# Patient Record
Sex: FEMALE | Race: WHITE | URBAN - METROPOLITAN AREA
[De-identification: names, ages, dates, MRNs, and addresses within clinical notes are randomized per-mention and may not be internally consistent; named-entity substitution may affect disease eponyms.]

---

## 2022-08-02 ENCOUNTER — OFFICE VISIT (OUTPATIENT)
Dept: SLEEP MEDICINE | Age: 67
End: 2022-08-02
Payer: COMMERCIAL

## 2022-08-02 VITALS
TEMPERATURE: 98.2 F | BODY MASS INDEX: 27.93 KG/M2 | OXYGEN SATURATION: 98 % | DIASTOLIC BLOOD PRESSURE: 77 MMHG | HEART RATE: 67 BPM | SYSTOLIC BLOOD PRESSURE: 132 MMHG | HEIGHT: 68 IN | WEIGHT: 184.3 LBS

## 2022-08-02 DIAGNOSIS — G47.33 OSA (OBSTRUCTIVE SLEEP APNEA): Primary | ICD-10-CM

## 2022-08-02 PROCEDURE — 99203 OFFICE O/P NEW LOW 30 MIN: CPT | Performed by: INTERNAL MEDICINE

## 2022-08-02 NOTE — PATIENT INSTRUCTIONS
217 [facility] [place]. [place], [address]  Tel.  [phone]  Fax. 100 [facility] 60  [place], [address] [facility]  Tel.  [phone]  Fax. [phone] [address]  Tel.  [phone]  Fax. [phone]     Sleep Apnea: After Your Visit  Your Care Instructions  Sleep apnea occurs when you frequently stop breathing for 10 seconds or longer during sleep. It can be mild to severe, based on the number of times per hour that you stop breathing or have slowed breathing. Blocked or narrowed airways in your nose, mouth, or throat can cause sleep apnea. Your airway can become blocked when your throat muscles and tongue relax during sleep. Sleep apnea is common, occurring in 1 out of 20 individuals. Individuals having any of the following characteristics should be evaluated and treated right away due to high risk and detrimental consequences from untreated sleep apnea:  Obesity  Congestive Heart failure  Atrial Fibrillation  Uncontrolled Hypertension  Type II Diabetes  Night-time Arrhythmias  Stroke  Pulmonary Hypertension  High-risk Driving Populations (pilots, truck drivers, etc.)  Patients Considering Weight-loss Surgery    How do you know you have sleep apnea? You probably have sleep apnea if you answer 'yes' to 3 or more of the following questions:  S - Have you been told that you Snore? T - Are you often Tired during the day? O - Has anyone Observed you stop breathing while sleeping? P- Do you have (or are being treated for) high blood Pressure? B - Are you obese (Body Mass Index > 35)? A - Is your Age 48years old or older? N - Is your Neck size greater than 16 inches? G - Are you male Gender? A sleep physician can prescribe a breathing device that prevents tissues in the throat from blocking your airway. Or your doctor may recommend using a dental device (oral breathing device) to help keep your airway open.  In some cases, surgery may be needed to remove enlarged tissues in the throat. Follow-up care is a key part of your treatment and safety. Be sure to make and go to all appointments, and call your doctor if you are having problems. It's also a good idea to know your test results and keep a list of the medicines you take. How can you care for yourself at home? Lose weight, if needed. It may reduce the number of times you stop breathing or have slowed breathing. Go to bed at the same time every night. Sleep on your side. It may stop mild apnea. If you tend to roll onto your back, sew a pocket in the back of your paCommutePays top. Put a tennis ball into the pocket, and stitch the pocket shut. This will help keep you from sleeping on your back. Avoid alcohol and medicines such as sleeping pills and sedatives before bed. Do not smoke. Smoking can make sleep apnea worse. If you need help quitting, talk to your doctor about stop-smoking programs and medicines. These can increase your chances of quitting for good. Prop up the head of your bed 4 to 6 inches by putting bricks under the legs of the bed. Treat breathing problems, such as a stuffy nose, caused by a cold or allergies. Use a continuous positive airway pressure (CPAP) breathing machine if lifestyle changes do not help your apnea and your doctor recommends it. The machine keeps your airway from closing when you sleep. If CPAP does not help you, ask your doctor whether you should try other breathing machines. A bilevel positive airway pressure machine has two types of air pressureâone for breathing in and one for breathing out. Another device raises or lowers air pressure as needed while you breathe. If your nose feels dry or bleeds when using one of these machines, talk with your doctor about increasing moisture in the air. A humidifier may help.   If your nose is runny or stuffy from using a breathing machine, talk with your doctor about using decongestants or a corticosteroid nasal spray.  When should you call for help? Watch closely for changes in your health, and be sure to contact your doctor if:  You still have sleep apnea even though you have made lifestyle changes. You are thinking of trying a device such as CPAP. You are having problems using a CPAP or similar machine. Where can you learn more? Go to Five Below. Enter H907 in the search box to learn more about \"Sleep Apnea: After Your Visit. \"   © 8627-6937 Healthwise, Incorporated. Care instructions adapted under license by [employer] (which disclaims liability or warranty for this information). This care instruction is for use with your licensed healthcare professional. If you have questions about a medical condition or this instruction, always ask your healthcare professional. Holli Mezave any warranty or liability for your use of this information. PROPER SLEEP HYGIENE    What to avoid  Do not have drinks with caffeine, such as coffee or black tea, for 8 hours before bed. Do not smoke or use other types of tobacco near bedtime. Nicotine is a stimulant and can keep you awake. Avoid drinking alcohol late in the evening, because it can cause you to wake in the middle of the night. Do not eat a big meal close to bedtime. If you are hungry, eat a light snack. Do not drink a lot of water close to bedtime, because the need to urinate may wake you up during the night. Do not read or watch TV in bed. Use the bed only for sleeping and sexual activity. What to try  Go to bed at the same time every night, and wake up at the same time every morning. Do not take naps during the day. Keep your bedroom quiet, dark, and cool. Get regular exercise, but not within 3 to 4 hours of your bedtime. .  Sleep on a comfortable pillow and mattress. If watching the clock makes you anxious, turn it facing away from you so you cannot see the time.   If you worry when you lie down, start a worry book. Well before bedtime, write down your worries, and then set the book and your concerns aside. Try meditation or other relaxation techniques before you go to bed. If you cannot fall asleep, get up and go to another room until you feel sleepy. Do something relaxing. Repeat your bedtime routine before you go to bed again. Make your house quiet and calm about an hour before bedtime. Turn down the lights, turn off the TV, log off the computer, and turn down the volume on music. This can help you relax after a busy day. Drowsy Driving  The [address] [place] [address] Road Traffic Safety Administration cites drowsiness as a causing factor in more than 377,549 police reported crashes annually, resulting in 76,000 injuries and 1,500 deaths. Other surveys suggest 55% of people polled have driven while drowsy in the past year, 23% had fallen asleep but not crashed, 3% crashed, and 2% had and accident due to drowsy driving. Who is at risk? Young Drivers: One study of drowsy driving accidents states that 55% of the drivers were under 25 years. Of those, 75% were male. Shift Workers and Travelers: People who work overnight or travel across time zones frequently are at higher risk of experiencing Circadian Rhythm Disorders. They are trying to work and function when their body is programed to sleep. Sleep Deprived: Lack of sleep has a serious impact on your ability to pay attention or focus on a task. Consistently getting less than the average of 8 hours your body needs creates partial or cumulative sleep deprivation. Untreated Sleep Disorders: Sleep Apnea, Narcolepsy, R.L.S., and other sleep disorders (untreated) prevent a person from getting enough restful sleep. This leads to excessive daytime sleepiness and increases the risk for drowsy driving accidents by up to 7 times. Medications / Alcohol: Even over the counter medications can cause drowsiness.  Medications that impair a drivers attention should have a warning label. Alcohol naturally makes you sleepy and on its own can cause accidents. Combined with excessive drowsiness its effects are amplified. Signs of Drowsy Driving:   * You don't remember driving the last few miles   * You may drift out of your tigre   * You are unable to focus and your thoughts wander   * You may yawn more often than normal   * You have difficulty keeping your eyes open / nodding off   * Missing traffic signs, speeding, or tailgating  Prevention-   Good sleep hygiene, lifestyle and behavioral choices have the most impact on drowsy driving. There is no substitute for sleep and the average person requires 8 hours nightly. If you find yourself driving drowsy, stop and sleep. Consider the sleep hygiene tips provided during your visit as well. Medication Refill Policy: Refills for all medications require 1 week advance notice. Please have your pharmacy fax a refill request. We are unable to fax, or call in \"controled substance\" medications and you will need to pick these prescriptions up from our office. [facility] Activation    Thank you for requesting access to [facility]. Please follow the instructions below to securely access and download your online medical record. [facility] allows you to send messages to your doctor, view your test results, renew your prescriptions, schedule appointments, and more. How Do I Sign Up? In your internet browser, go to [email or web address]. Click on the First Time User? Click Here link in the Sign In box. You will see the New Member Sign Up page. Enter your [employer] Access Code exactly as it appears below. You will not need to use this code after youve completed the sign-up process. If you do not sign up before the expiration date, you must request a new code.     [facility] Access Code: [email or web address]  Expires: 2022  9:44 AM (This is the date your [employer] access code will )    Enter the last four digits of your Social Security Number (xxxx) and Date of Birth (mm/dd/yyyy) as indicated and click Submit. You will be taken to the next sign-up page. Create a Infotrieve ID. This will be your Infotrieve login ID and cannot be changed, so think of one that is secure and easy to remember. Create a Infotrieve password. You can change your password at any time. Enter your Password Reset Question and Answer. This can be used at a later time if you forget your password. Enter your e-mail address. You will receive e-mail notification when new information is available in [address] Click Sign Up. You can now view and download portions of your medical record. Click the Zoodak link to download a portable copy of your medical information. Additional Information    If you have questions, please call [phone]. Remember, Infotrieve is NOT to be used for urgent needs. For medical emergencies, dial 911.

## 2022-08-02 NOTE — PROGRESS NOTES
217 [facility] [place]. [place], [address]  Tel.  [phone]  Fax. 100 [facility] 60  [place], [address] [facility]  Tel.  [phone]  Fax. [phone] [address]  Tel.  [phone]  Fax. [phone]       Donnell Menjivar is a 73y.o. year old female referred by Dr. Roz River  for evaluation of a sleep disorder. ASSESSMENT/PLAN:      ICD-10-CM ICD-9-CM    1. SPENCER (obstructive sleep apnea)  G47.33 327.23 SLEEP STUDY UNATTENDED, 4 CHANNEL          Patient has a history and examination consistent with the diagnosis of sleep apnea. Follow-up and Dispositions    Return for telephone follow-up after testing is completed. * The patient currently has a Low Risk for having sleep apnea. STOP-BANG score 3.    * Sleep testing was ordered for initial evaluation. Orders Placed This Encounter    SLEEP STUDY UNATTENDED, 4 CHANNEL     Order Specific Question:   Reason for Exam     Answer:   SPENCER       * She was provided information on sleep apnea including corresponding risk factors and the importance of proper treatment. * Treatment options were reviewed in detail, she would like to proceed with OAT (Dr. Roz River) / PAP therapy. * The patient was counseled regarding proper sleep hygiene, with emphasis on ensuring sufficient total sleep time; safe driving and the benefits of exercise and weight loss. * All of her questions were addressed. 2. Recommended a dedicated weight loss program through appropriate diet and exercise regimen as significant weight reduction has been shown to reduce severity of obstructive sleep apnea. SUBJECTIVE/OBJECTIVE:    Donnell Menjivar is an 73 y.o. female referred for evaluation for a sleep disorder. She complains of snoring associated with waking up snorting and day time tiredness, falling asleep easily when inactive.   Symptoms began several years ago, may be slightly improved since that time. She usually can fall asleep in 5 minutes. Family or house members note snoring. She denies of symptoms indicative of cataplexy, sleep paralysis or sleep related hallucinations. She denies of a history of unusual movements occurring during sleep. Blank Simon does wake up frequently at night. She is bothered by waking up too early and left unable to get back to sleep. She actually sleeps about   hours at night and wakes up about 3 times during the night. She does not work shifts:  .   Gracia Canal indicates she does get too little sleep at night. Her bedtime is 2100. She awakens at 0600. She does not take naps. She has the following observed behaviors: Loud snoring, Light snoring;  . Other remarks: waking with a gasp or snort    The patient has not undergone diagnostic testing for the current problems. Review of Systems:  Constitutional:  No significant weight loss or weight gain  Eyes:  No blurred vision  CVS:  No significant chest pain  Pulm:  No significant shortness of breath  GI:  No significant nausea or vomiting  :  No significant nocturia  Musculoskeletal:  No significant joint pain at night  Skin:  No significant rashes  Neuro:  No significant dizziness   Psych:  No active mood issues    Sleep Review of Systems: notable for Negative difficulty falling asleep; Negative awakenings at night; Negative perceived regular dreaming; Negative nightmares; Negative  early morning headaches; Negative  memory problems; Negative  concentration issues; Negative caffeine;  Negative alcohol;   Negative history of any automobile or occupational accidents due to daytime drowsiness. A[place]on Sleepiness Score: 12   and Modified F.O.S.Q. Score Total / 2: 16    Visit Vitals  /77   Pulse 67   Temp 98.2 °F (36.8 °C)   Ht 5' 8\" (1.727 m)   Wt 184 lb 4.8 oz (83.6 kg)   SpO2 98%   BMI 28.02 kg/m²    Neck circ.  in \"inches\": 13.5      General:   Alert, oriented, not in acute distress   Eyes:  Anicteric Sclerae; intact EOM's   Nose:  No obvious nasal septum deviation    Oropharynx:   Mallampati score 4, thick tongue base, uvula not seen due to low-lying soft palate, narrow tonsilo-pharyngeal pilars, tongue scalloped   Neck:   midline trachea,  no JVD   Chest/Lungs:  symmetrical lung expansion ,clear lung fields on auscultation    CVS:  Normal rate, regular rhythm    Extremities:  No obvious rashes, absent edema    Neuro:  No focal deficits; No obvious tremor    Psych:  Normal eye contact; normal  affect, normal countenance          Judd Cordon MD, FAASM  Diplomate American Board of Sleep Medicine  Diplomate in Sleep Medicine - ABP    Electronically signed.  08/02/22

## 2024-04-29 ENCOUNTER — TELEPHONE (OUTPATIENT)
Age: 69
End: 2024-04-29

## 2024-04-29 ENCOUNTER — OFFICE VISIT (OUTPATIENT)
Age: 69
End: 2024-04-29
Payer: COMMERCIAL

## 2024-04-29 VITALS — BODY MASS INDEX: 28.25 KG/M2 | HEIGHT: 67 IN | WEIGHT: 180 LBS

## 2024-04-29 DIAGNOSIS — D24.1 PAPILLOMA OF RIGHT BREAST: Primary | ICD-10-CM

## 2024-04-29 DIAGNOSIS — N60.21 SCLEROSING ADENOSIS OF BREAST, RIGHT: ICD-10-CM

## 2024-04-29 PROCEDURE — 99203 OFFICE O/P NEW LOW 30 MIN: CPT | Performed by: SURGERY

## 2024-04-29 PROCEDURE — 1123F ACP DISCUSS/DSCN MKR DOCD: CPT | Performed by: SURGERY

## 2024-04-29 PROCEDURE — 76642 ULTRASOUND BREAST LIMITED: CPT | Performed by: SURGERY

## 2024-04-29 NOTE — PROGRESS NOTES
bleeding, inverted nipple, mass, nipple discharge, skin change or tenderness.   Lymphadenopathy:      Upper Body:      Right upper body: No axillary adenopathy.      Left upper body: No axillary adenopathy.      BREAST ULTRASOUND, Preop planning  Indication:preop planning  right Breast lower outer quadrant   Technique:  The area was scanned using a high-frequency linear-array near-field transducer  Findings:  irregular mass with dropout biopsy change and clip just superior to skin incision   Impression: Biopsy site visible with ultrasound  Disposition:  Will schedule excisional biopsy       ASSESSMENT and PLAN   Diagnosis Orders   1. Papilloma of right breast        2. Sclerosing adenosis of breast, right          67 yo female with right papillomas adenosis  Recommend excisional biopsy due to risk upgrade  Discussed procedure and risks  Risks include bleeding, bruising, scar, infection, need for more surgery   Will schedule  30 minutes was spent with patient on counseling and coordination of care.

## 2024-04-30 ENCOUNTER — CLINICAL DOCUMENTATION (OUTPATIENT)
Age: 69
End: 2024-04-30

## 2024-04-30 NOTE — PROGRESS NOTES
I called and left a detailed message for patient to return my call to schedule surgery.  ([phone])

## 2024-05-01 ENCOUNTER — CLINICAL DOCUMENTATION (OUTPATIENT)
Age: 69
End: 2024-05-01

## 2024-05-01 NOTE — PROGRESS NOTES
Patient returned my call from yesterday, she stated that she doesn't want to schedule surgery and she will like to get a second opinion.